# Patient Record
Sex: MALE | Race: BLACK OR AFRICAN AMERICAN | NOT HISPANIC OR LATINO | Employment: UNEMPLOYED | URBAN - METROPOLITAN AREA
[De-identification: names, ages, dates, MRNs, and addresses within clinical notes are randomized per-mention and may not be internally consistent; named-entity substitution may affect disease eponyms.]

---

## 2017-04-11 ENCOUNTER — TELEPHONE (OUTPATIENT)
Dept: SMOKING CESSATION | Facility: CLINIC | Age: 57
End: 2017-04-11

## 2017-04-18 ENCOUNTER — CLINICAL SUPPORT (OUTPATIENT)
Dept: SMOKING CESSATION | Facility: CLINIC | Age: 57
End: 2017-04-18
Payer: COMMERCIAL

## 2017-04-18 DIAGNOSIS — F17.200 NICOTINE DEPENDENCE: Primary | ICD-10-CM

## 2017-04-18 PROCEDURE — 99407 BEHAV CHNG SMOKING > 10 MIN: CPT | Mod: S$GLB,,, | Performed by: INTERNAL MEDICINE

## 2017-04-24 ENCOUNTER — CLINICAL SUPPORT (OUTPATIENT)
Dept: SMOKING CESSATION | Facility: CLINIC | Age: 57
End: 2017-04-24
Payer: COMMERCIAL

## 2017-04-24 VITALS
WEIGHT: 239.88 LBS | BODY MASS INDEX: 33.45 KG/M2 | DIASTOLIC BLOOD PRESSURE: 79 MMHG | SYSTOLIC BLOOD PRESSURE: 114 MMHG | HEART RATE: 86 BPM

## 2017-04-24 DIAGNOSIS — F17.200 SMOKES AND MOTIVATED TO QUIT: Primary | ICD-10-CM

## 2017-04-24 PROCEDURE — 99404 PREV MED CNSL INDIV APPRX 60: CPT | Mod: S$GLB,,,

## 2017-04-24 RX ORDER — VARENICLINE TARTRATE 0.5 (11)-1
KIT ORAL
Qty: 1 PACKAGE | Refills: 0 | Status: SHIPPED | OUTPATIENT
Start: 2017-04-24 | End: 2017-05-25 | Stop reason: DRUGHIGH

## 2017-04-24 RX ORDER — DM/P-EPHED/ACETAMINOPH/DOXYLAM 30-7.5/3
LIQUID (ML) ORAL
Qty: 168 LOZENGE | Refills: 0 | Status: SHIPPED | OUTPATIENT
Start: 2017-04-24

## 2017-04-24 NOTE — PROGRESS NOTES
4/14/17      See Smoking Cessation Smart Form    Additional Interventions:  · Recommended patient participate in Smoking Cessation Group .  · Discussed triggers and planning for quit date.  · Given patient education handouts from [employer] Tool Kit #3  · Educated patient about and gave patient education handouts from  [employer] Information on: Chantix & NRT  · Provided phone number to reach [facility] CTT[facility] (Certified Tobacco Treatment Specialist) for future assistance and numbers to 24/7 Quit lines.

## 2017-05-15 ENCOUNTER — CLINICAL SUPPORT (OUTPATIENT)
Dept: SMOKING CESSATION | Facility: CLINIC | Age: 57
End: 2017-05-15
Payer: COMMERCIAL

## 2017-05-15 DIAGNOSIS — F17.200 SMOKES AND MOTIVATED TO QUIT: Primary | ICD-10-CM

## 2017-05-15 PROCEDURE — 99402 PREV MED CNSL INDIV APPRX 30: CPT | Mod: S$GLB,,,

## 2017-05-15 NOTE — PROGRESS NOTES
Individual Follow-Up Form    5/15/2017    Quit Date: Has not quit yet.    Clinical Status of Patient: Outpatient    Length of Service: 30 minutes    Continuing Medication:   Chantix & nicotine lozenges     Target Symptoms: Withdrawal and medication side effects. The following were  rated moderate (3) to severe (4) on TCRS:  · Moderate (3): none  · Severe (4): none    Comments:   Patient is still smoking 1 - 2 cigarettes/day.  Started Chantix & tolerating without any side effects.  We talked about what will be different this time with his quit process that he feels will give him a better chance at staying quit.  He is mainly reliant on the medication so I assisted him to think about some behavioral strategies he might implement to reduce risk of relapse.  One motivator for him at this time is there is a 10 y/o child living in his home now and he doesn't smoke around the child.  We talked about his personal reasons for quitting and keeping focus on this.  I explained it was important to make routine changes to help disconnect the urge network.  He said he understood this and would give it some thought    Diagnosis: F17.200    Next Visit: 6/5/17

## 2017-05-25 DIAGNOSIS — F17.200 SMOKES AND MOTIVATED TO QUIT: Primary | ICD-10-CM

## 2017-05-29 RX ORDER — VARENICLINE TARTRATE 1 MG/1
1 TABLET, FILM COATED ORAL 2 TIMES DAILY
Qty: 56 TABLET | Refills: 0 | Status: SHIPPED | OUTPATIENT
Start: 2017-05-29 | End: 2017-06-29 | Stop reason: SDUPTHER

## 2017-06-05 ENCOUNTER — CLINICAL SUPPORT (OUTPATIENT)
Dept: SMOKING CESSATION | Facility: CLINIC | Age: 57
End: 2017-06-05
Payer: COMMERCIAL

## 2017-06-05 DIAGNOSIS — F17.210 NICOTINE DEPENDENCE, CIGARETTES, UNCOMPLICATED: Primary | ICD-10-CM

## 2017-06-05 PROCEDURE — 99404 PREV MED CNSL INDIV APPRX 60: CPT | Mod: S$GLB,,,

## 2017-06-05 NOTE — PROGRESS NOTES
Individual Follow-Up Form    6/5/2017    Planned Quit Date: 6/6/17  Clinical Status of Patient: Outpatient    Length of Service: 30 minutes    Continuing Medication:   Chantix & nicotine lozenges    Target Symptoms: Withdrawal and medication side effects. The following were  rated moderate (3) to severe (4) on TCRS:  · Moderate (3): none  · Severe (4): none    Comments:   Patient reported he was down to 1 cigarette/day.  No problems or side effects from the Chantix.  Had not received his second Rx of Chantix and had left a voice mail message about that.  I called [employer] and they said it was delivered today.  Patient had not been home yet so he didn't know if it was delivered or not.  He will call if it was not delivered.  We talked about his triggers etc., boredom being one of the biggest.  I reviewed surfing the urge and RAIN with him and gave him written instructions on how to do the procedure as well as E mailing him a guided urge surfing activity.  He was very interested in this.    Diagnosis: F17.210    Next Visit:  7/5/17

## 2017-06-20 ENCOUNTER — TELEPHONE (OUTPATIENT)
Dept: SMOKING CESSATION | Facility: CLINIC | Age: 57
End: 2017-06-20

## 2017-06-20 NOTE — TELEPHONE ENCOUNTER
6/20/17   8:45 a.m.    Returned patient's call.  He thought he missed an appointment and was questioning if he was due for some medication.  I left a detailed message telling him his appointment was scheduled for 7/5.  I also told him according to my records he was not due for more medication.  When he came in for his last appointment he said his Chantix had not been delivered but that he had not been home yet.  I had called [facility] and they said it was delivered that day.  Patient was supposed to call me back if it was not delivered as communicated by [facility] and patient never called.  I left a message with [facility] phone number and told patient to call me if he had any further problems.

## 2017-06-24 ENCOUNTER — HOSPITAL ENCOUNTER (EMERGENCY)
Facility: HOSPITAL | Age: 57
Discharge: HOME | End: 2017-06-24
Attending: EMERGENCY MEDICINE
Payer: OTHER GOVERNMENT

## 2017-06-24 VITALS
HEART RATE: 70 BPM | BODY MASS INDEX: 32.9 KG/M2 | OXYGEN SATURATION: 100 % | TEMPERATURE: 98 F | WEIGHT: 235 LBS | HEIGHT: 71 IN | RESPIRATION RATE: 16 BRPM | SYSTOLIC BLOOD PRESSURE: 147 MMHG | DIASTOLIC BLOOD PRESSURE: 96 MMHG

## 2017-06-24 DIAGNOSIS — S70.01XA CONTUSION OF RIGHT HIP, INITIAL ENCOUNTER: ICD-10-CM

## 2017-06-24 DIAGNOSIS — M25.551 RIGHT HIP PAIN: ICD-10-CM
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
## 2017-06-24 PROCEDURE — 96372 THER/PROPH/DIAG INJ SC/IM: CPT

## 2017-06-24 PROCEDURE — 99284 EMERGENCY DEPT VISIT MOD MDM: CPT | Mod: 25

## 2017-06-24 PROCEDURE — 63600175 PHARM REV CODE 636 W HCPCS: Performed by: PHYSICIAN ASSISTANT

## 2017-06-24 PROCEDURE — 99284 EMERGENCY DEPT VISIT MOD MDM: CPT | Mod: ,,, | Performed by: PHYSICIAN ASSISTANT

## 2017-06-24 RX ORDER — KETOROLAC TROMETHAMINE 30 MG/ML
15 INJECTION, SOLUTION INTRAMUSCULAR; INTRAVENOUS
Status: COMPLETED | OUTPATIENT
Start: 2017-06-24 | End: 2017-06-24

## 2017-06-24 RX ADMIN — KETOROLAC TROMETHAMINE 15 MG: 30 INJECTION, SOLUTION INTRAMUSCULAR at 12:06

## 2017-06-24 NOTE — ED NOTES
Patient observed leaving ED, encouraged to stay for dc info. PA in hallway to speak with patient , encouraged to stay for dc info, states he has to go home,

## 2017-06-24 NOTE — ED NOTES
The patient fell yesterday after a trip in a parking lot. C/o right hip pain. Right hip replacement about 2 years ago. Scar to right hip.

## 2017-06-24 NOTE — ED PROVIDER NOTES
"Encounter Date: 6/24/2017    SCRIBE #1 NOTE: I, Bernardino Fernando, am scribing for, and in the presence of,  Dr. Gutierrez. I have scribed the following portions of the note - the APC attestation.       History     Chief Complaint   Patient presents with    Hip Injury     state fell yesterday/right hip     57 year old male with history of HTN presents to the ER with chief complaint of right hip pain. He describes 5/10 aching pain in lateral right hip.   He says he was "rushing to the ER for my son" and  tripped over a reflector on the ground and fell to the ground.  He had an abrasion on his left knee.  He was able to walk after the fall, but he reports onset of pain in the right hip 1 hour after the fall.  His pain worsened yesterday, but is somewhat better this morning.  He took hydrocodone  mg yesterday.  He denies additional joint paint or injury.  He has a history of back pain radiating into the left leg, this is somewhat worse since the fall per the patient.  He denies numbness or tingling of the extremities.  He denies head injury, LOC, or neck pain.  He had a hip replacement 2 year ago on the right side.   He denies additional complaints at this time.            Review of patient's allergies indicates:  No Known Allergies  Past Medical History:   Diagnosis Date    Hypertension      Past Surgical History:   Procedure Laterality Date    SHOULDER SURGERY      right     No family history on file.  Social History   Substance Use Topics    Smoking status: Former Smoker     Packs/day: 1.00     Years: 20.00     Types: Cigarettes     Quit date: 10/18/2016    Smokeless tobacco: Never Used    Alcohol use No     Review of Systems   Constitutional: Negative for chills and fever.   HENT: Negative for sore throat.    Respiratory: Negative for shortness of breath.    Cardiovascular: Negative for chest pain.   Gastrointestinal: Negative for abdominal pain, nausea and vomiting.   Genitourinary: Negative for dysuria. " "  Musculoskeletal: Positive for arthralgias, back pain and myalgias. Negative for joint swelling.   Skin: Positive for wound. Negative for rash.   Neurological: Negative for weakness.   Hematological: Does not bruise/bleed easily.       Physical Exam     Initial Vitals [06/24/17 1042]   BP Pulse Resp Temp SpO2   (!) 154/88 70 16 98 °F (36.7 °C) 98 %      MAP       110         Physical Exam    Constitutional: He appears well-developed and well-nourished.   HENT:   Head: Atraumatic.   Mouth/Throat: Oropharynx is clear and moist.   Eyes: Conjunctivae and EOM are normal. Pupils are equal, round, and reactive to light.   Neck: Normal range of motion. Neck supple. No spinous process tenderness present.   Cardiovascular: Normal rate and regular rhythm.   Pulmonary/Chest: Breath sounds normal. No respiratory distress. He has no wheezes. He has no rhonchi. He has no rales.   Abdominal: Soft. Bowel sounds are normal. There is no tenderness.   Musculoskeletal:        Right hip: He exhibits tenderness (lateral hip.  no overlying bruising). He exhibits normal range of motion, normal strength, no swelling and no deformity.        Lumbar back: He exhibits pain (left lower and right lower back). He exhibits normal range of motion, no bony tenderness and no swelling.        Right upper leg: He exhibits no tenderness and no swelling.        Left upper leg: He exhibits no tenderness and no swelling.   Neurological: He is alert and oriented to person, place, and time.   Skin: No rash noted.   Psychiatric: He has a normal mood and affect.         ED Course   Procedures  Labs Reviewed - No data to display               Medical Decision Making:   History:   Old Medical Records: I decided to obtain old medical records.  Clinical Tests:   Radiological Study: Reviewed and Ordered       APC / Resident Notes:   Right hip xray shows good alignment of right hip replacement with intact hardware.  Radiology notes " Small irregular linear lucency " "right trochanteric region, could certainly be postop changes, a more recent fracture cannot be excluded, this would have to be compare with prior studies."   We do not have previous images at our facility.  Due to recent trauma, right lateral hip tenderness, and possible abnormality on x-ray I have discussed with orthopedic physician on-call.  He has recommended follow-up CT of the pelvis.  CT reveals Postoperative change of RIGHT total hip arthroplasty with remote fracture of the superior aspect of the greater trochanter.  No evidence of acute fracture.  Patient is ambulating without difficulty.  He had good pain relief with Toradol given in the ER.  The patient will be discharged home.  He'll continue taking NSAIDs at home and his pain medication as needed.  Patient denies any known previous fracture, but will follow-up with his orthopedic physician this week as instructed.  Patient is comfortable with this plan.  He is given return precautions.I discussed the care of this patient with my supervising MD.             [place] Attestation:   Scribe #1: I performed the above scribed service and the documentation accurately describes the services I performed. I attest to the accuracy of the note.    Attending Attestation:     Physician Attestation Statement for NP/PA:   I discussed this assessment and plan of this patient with the NP/PA, but I did not personally examine the patient. The face to face encounter was performed by the NP/PA.    Other NP/PA Attestation Additions:    History of Present Illness: Trauma to the hip with pain.         Physician Attestation for Scribe:  Physician Attestation Statement for Scribe #1: I, Dr. Gutierrez, reviewed documentation, as scribed by Bernardino Fernando in my presence, and it is both accurate and complete.                 ED Course     Clinical Impression:   The primary encounter diagnosis was Fall, initial encounter. A diagnosis of Right hip pain was also pertinent to this visit.     "                       MILTON Dueñas  06/24/17 5710

## 2017-06-26 ENCOUNTER — TELEPHONE (OUTPATIENT)
Dept: ORTHOPEDICS | Facility: CLINIC | Age: 57
End: 2017-06-26

## 2017-06-26 NOTE — TELEPHONE ENCOUNTER
Spoke with pt.  He was seen at the [facility] today.   Pt will be following with them.

## 2017-06-26 NOTE — TELEPHONE ENCOUNTER
----- Message from Dakota Blount MD sent at 6/26/2017  3:02 PM CDT -----  Please schedule patient follow-up in 2 weeks with Pennie or Haydee for right DARCY with unusual x-ray findings. If patient already has follow-up with outside orthopedist, that is okay, please just message me to let me know. Thank you.

## 2017-06-29 DIAGNOSIS — F17.200 SMOKES AND MOTIVATED TO QUIT: ICD-10-CM

## 2017-06-29 RX ORDER — VARENICLINE TARTRATE 1 MG/1
1 TABLET, FILM COATED ORAL 2 TIMES DAILY
Qty: 56 TABLET | Refills: 0 | Status: SHIPPED | OUTPATIENT
Start: 2017-06-29

## 2017-07-05 ENCOUNTER — CLINICAL SUPPORT (OUTPATIENT)
Dept: SMOKING CESSATION | Facility: CLINIC | Age: 57
End: 2017-07-05
Payer: COMMERCIAL

## 2017-07-05 DIAGNOSIS — F17.210 NICOTINE DEPENDENCE, CIGARETTES, UNCOMPLICATED: Primary | ICD-10-CM

## 2017-07-05 PROCEDURE — 99402 PREV MED CNSL INDIV APPRX 30: CPT | Mod: S$GLB,,,

## 2017-07-05 NOTE — PROGRESS NOTES
Individual Follow-Up Form    7/5/2017    Quit Date: Patient has not quit yet.    Clinical Status of Patient: Outpatient    Length of Service: 30 minutes    Continuing Medication: Chantix & nicotine lozenge    Target Symptoms: Withdrawal and medication side effects. The following were  rated moderate (3) to severe (4) on TCRS:  · Moderate (3): urges  · Severe (4): none    Comments:   Patient originally said he was off Chantix for a few days because he was waiting for it to arrive.  I went over the dates with him that Chantix was ordered and should have arrived at his home.  He said the first Rx never arrived.  Chantix was ordered 4/24/17, 5/25/17, and 6/29/17.  I called [employer] to verify dates Rx was delivered to his home.  All three prescriptions were delivered according to their records, the last Rx was delivered this morning about 9:50 a.m.  Patient then said he remembered he did have 2 prescriptions already.  He said he wasn't identifying the first prescription, the starter pack as one of the prescriptions he received.  So at this time he has received all 3 Rx's.  He is still smoking 3 - 4 cigarettes a day, up from the 1 cigarette/day that he was smoking previously.  Continues to identify boredom as a major trigger for this but has not taken any steps to address this despite the fact he says he really wants to quit.  He did not follow through with practicing mindfulness and the RAIN technique saying he forgot.  He said again this was interesting to him and he felt he could do it especially when bored because it did give him something to do.  I gave him another summary card on the technique.  He said he is leaving on vacation for at least a week and while he is away he knows he will not smoke but when he returns home that will be the time he smokes again.  He didn't plan on taking the Chantix while he was away because he didn't think he would smoke then.  I explained Chantix didn't work that way, that it had  to be built up in the system in order to be effective and if he didn't take it then the effectiveness would be delayed until it reached therapeutic level again.  He said he understood this.    Diagnosis: F17.210    Next Visit: Patient did not want to schedule next Clinic visit.  I will call him in 3 - 4 weeks.

## 2017-07-31 ENCOUNTER — CLINICAL SUPPORT (OUTPATIENT)
Dept: SMOKING CESSATION | Facility: CLINIC | Age: 57
End: 2017-07-31
Payer: COMMERCIAL

## 2017-07-31 DIAGNOSIS — F17.210 NICOTINE DEPENDENCE, CIGARETTES, UNCOMPLICATED: Primary | ICD-10-CM

## 2017-07-31 PROCEDURE — 99407 BEHAV CHNG SMOKING > 10 MIN: CPT | Mod: S$GLB,,,

## 2018-04-12 ENCOUNTER — CLINICAL SUPPORT (OUTPATIENT)
Dept: SMOKING CESSATION | Facility: CLINIC | Age: 58
End: 2018-04-12
Payer: COMMERCIAL

## 2018-04-12 DIAGNOSIS — F17.200 NICOTINE DEPENDENCE: Primary | ICD-10-CM

## 2018-04-12 PROCEDURE — 99407 BEHAV CHNG SMOKING > 10 MIN: CPT | Mod: S$GLB,,,

## 2020-08-06 ENCOUNTER — OFFICE VISIT (OUTPATIENT)
Dept: OPTOMETRY | Facility: CLINIC | Age: 60
End: 2020-08-06
Payer: COMMERCIAL

## 2020-08-06 DIAGNOSIS — H52.7 REFRACTIVE ERROR: ICD-10-CM

## 2020-08-06 DIAGNOSIS — H25.13 NUCLEAR SCLEROSIS OF BOTH EYES: Primary | ICD-10-CM

## 2020-08-06 PROCEDURE — 99999 PR PBB SHADOW E&M-EST. PATIENT-LVL II: ICD-10-PCS | Mod: PBBFAC,,, | Performed by: OPTOMETRIST

## 2020-08-06 PROCEDURE — 92004 COMPRE OPH EXAM NEW PT 1/>: CPT | Mod: S$PBB,,, | Performed by: OPTOMETRIST

## 2020-08-06 PROCEDURE — 92015 DETERMINE REFRACTIVE STATE: CPT | Mod: ,,, | Performed by: OPTOMETRIST

## 2020-08-06 PROCEDURE — 92004 PR EYE EXAM, NEW PATIENT,COMPREHESV: ICD-10-PCS | Mod: S$PBB,,, | Performed by: OPTOMETRIST

## 2020-08-06 PROCEDURE — 99999 PR PBB SHADOW E&M-EST. PATIENT-LVL II: CPT | Mod: PBBFAC,,, | Performed by: OPTOMETRIST

## 2020-08-06 PROCEDURE — 92015 PR REFRACTION: ICD-10-PCS | Mod: ,,, | Performed by: OPTOMETRIST

## 2020-08-06 PROCEDURE — 99212 OFFICE O/P EST SF 10 MIN: CPT | Mod: PBBFAC,PO | Performed by: OPTOMETRIST

## 2020-08-06 NOTE — PROGRESS NOTES
Subjective:       Patient ID: Pepper Walsh is a 60 y.o. male      Chief Complaint   Patient presents with    Concerns About Ocular Health     History of Present Illness  Dls: ? Yrs     61 y/o male presents today for ocular health check.  Pt c/o blurry vision at near ou. Pt wears otc readers.     No tearing  No itching   No burning  No pain  No ha's  No floaters  No flashes    Eye meds  None     Assessment/Plan:     1. Nuclear sclerosis of both eyes  Educated pt on presence of cataracts and effects on vision. No surgery at this time. Recheck in one year.    2. Refractive error  Educated patient on refractive error and discussed lens options. Dispensed updated spectacle Rx. Educated about adaptation period to new specs.    Eyeglass Final Rx     Eyeglass Final Rx       Sphere Cylinder Axis Add    Right Bison Sphere  +2.50    Left +0.25 +0.25 065 +2.50    Expiration Date: 8/7/2021                  Follow up in about 1 year (around 8/6/2021).

## 2021-04-15 ENCOUNTER — PATIENT MESSAGE (OUTPATIENT)
Dept: RESEARCH | Facility: HOSPITAL | Age: 61
End: 2021-04-15
